# Patient Record
(demographics unavailable — no encounter records)

---

## 2024-12-18 NOTE — PHYSICAL EXAM
FOLLOW UP IN 2 MONTHS WITH  FANNIE   [de-identified] : left alar: .2cm x 1cm skin lesion normal color nasal dorsum: ,2cm x 1cm normal color

## 2024-12-18 NOTE — HISTORY OF PRESENT ILLNESS
[FreeTextEntry1] : The patient, a young individual, presents with concerns about two facial lesions. The first is a bump on the side of the nose that has been present for approximately 10 years. The patient expresses worry about its potential growth, citing family history of similar lesions becoming more prominent with age. The bump does not produce any discharge but contains pores from which blackheads can be extracted. The second lesion is located on the forehead and has been present since birth. Both lesions are asymptomatic and do not drain. The patient is considering removal of these lesions for cosmetic reasons. Other people reportedly do not notice the nasal lesion, but the patient is aware of it when looking in the mirror.

## 2024-12-18 NOTE — ASSESSMENT
[FreeTextEntry1] : 25-year-old female presents for evaluation of 2 skin lesions.  Explained to the patient skin lesions look benign.  Explained to the patient any type of surgical procedure will leave her with scars and very sensitive areas.  Explained to the patient if they become bigger and she is more concerned would recommend removal at this time cosmetic concerns outweigh the resection.  Told patient to follow-up as needed.